# Patient Record
Sex: MALE | Race: BLACK OR AFRICAN AMERICAN | NOT HISPANIC OR LATINO | Employment: UNEMPLOYED | URBAN - METROPOLITAN AREA
[De-identification: names, ages, dates, MRNs, and addresses within clinical notes are randomized per-mention and may not be internally consistent; named-entity substitution may affect disease eponyms.]

---

## 2022-11-30 ENCOUNTER — TELEPHONE (OUTPATIENT)
Dept: DERMATOLOGY | Facility: CLINIC | Age: 32
End: 2022-11-30
Payer: MEDICAID

## 2022-11-30 NOTE — TELEPHONE ENCOUNTER
"Called patient, patient wanted an appointment with dermatology for foot pain. Patient has [employer], I informed him he will need a PCP with [facility]  and a referral before he can be booked. Patient was upset and stated, "so yall will deny someone with foot pain because of a referral" Informed him that is the policy for [facility] and patient hung up.     ----- Message from Gunjan Montilla sent at 11/30/2022  9:19 AM CST -----  Contact: Cathie  Type:  Sooner Apoointment Request    Caller is requesting a sooner appointment.  Caller declined first available appointment listed below.  Caller will not accept being placed on the waitlist and is requesting a message be sent to doctor.  Name of Caller: Marino   When is the first available appointment? 05/2023  Symptoms: pain on heel of foot/possible access/ blistered/ cannot put weight on left foot   Would the patient rather a call back or a response via [facility]? call  Best Call Back Number: [phone] (work)   Additional Information: The patient would like to be seen sooner.     " Pt is aox3. He complains of swelling x this morning after using a hair product. He reports swelling in scalp and face around the eye. He denies chest pain or SOB.